# Patient Record
Sex: FEMALE
[De-identification: names, ages, dates, MRNs, and addresses within clinical notes are randomized per-mention and may not be internally consistent; named-entity substitution may affect disease eponyms.]

---

## 2022-07-27 PROBLEM — Z00.00 ENCOUNTER FOR PREVENTIVE HEALTH EXAMINATION: Status: ACTIVE | Noted: 2022-07-27

## 2022-08-24 ENCOUNTER — APPOINTMENT (OUTPATIENT)
Dept: FAMILY MEDICINE | Facility: CLINIC | Age: 27
End: 2022-08-24

## 2022-08-24 VITALS
WEIGHT: 240 LBS | RESPIRATION RATE: 14 BRPM | SYSTOLIC BLOOD PRESSURE: 120 MMHG | DIASTOLIC BLOOD PRESSURE: 82 MMHG | BODY MASS INDEX: 42.52 KG/M2 | HEIGHT: 63 IN | OXYGEN SATURATION: 96 % | HEART RATE: 85 BPM

## 2022-08-24 VITALS — TEMPERATURE: 98 F

## 2022-08-24 DIAGNOSIS — Z13.0 ENCOUNTER FOR SCREENING FOR DISEASES OF THE BLOOD AND BLOOD-FORMING ORGANS AND CERTAIN DISORDERS INVOLVING THE IMMUNE MECHANISM: ICD-10-CM

## 2022-08-24 DIAGNOSIS — Z83.438 FAMILY HISTORY OF OTHER DISORDER OF LIPOPROTEIN METABOLISM AND OTHER LIPIDEMIA: ICD-10-CM

## 2022-08-24 DIAGNOSIS — Z80.1 FAMILY HISTORY OF MALIGNANT NEOPLASM OF TRACHEA, BRONCHUS AND LUNG: ICD-10-CM

## 2022-08-24 DIAGNOSIS — Z83.3 FAMILY HISTORY OF DIABETES MELLITUS: ICD-10-CM

## 2022-08-24 DIAGNOSIS — Z82.5 FAMILY HISTORY OF ASTHMA AND OTHER CHRONIC LOWER RESPIRATORY DISEASES: ICD-10-CM

## 2022-08-24 DIAGNOSIS — Z13.220 ENCOUNTER FOR SCREENING FOR LIPOID DISORDERS: ICD-10-CM

## 2022-08-24 DIAGNOSIS — Z78.9 OTHER SPECIFIED HEALTH STATUS: ICD-10-CM

## 2022-08-24 DIAGNOSIS — Z76.89 PERSONS ENCOUNTERING HEALTH SERVICES IN OTHER SPECIFIED CIRCUMSTANCES: ICD-10-CM

## 2022-08-24 DIAGNOSIS — Z82.49 FAMILY HISTORY OF ISCHEMIC HEART DISEASE AND OTHER DISEASES OF THE CIRCULATORY SYSTEM: ICD-10-CM

## 2022-08-24 DIAGNOSIS — Z82.3 FAMILY HISTORY OF STROKE: ICD-10-CM

## 2022-08-24 LAB — CYTOLOGY CVX/VAG DOC THIN PREP: NORMAL

## 2022-08-24 PROCEDURE — 99204 OFFICE O/P NEW MOD 45 MIN: CPT | Mod: 25

## 2022-08-24 PROCEDURE — G0444 DEPRESSION SCREEN ANNUAL: CPT | Mod: 59

## 2022-08-24 NOTE — PLAN
[FreeTextEntry1] : \par est care\par \par \par depression\par Denies suicidal or homicidal ideations\par advised to see a therapist, behavioral consultation-agreed to this\par Declined ssri for now\par phq9= 18\par \par abd bloating/ pelvic discomfort and abd discomfort on palpation\par us abd\par us pelvic/transvaginal \par gi consult\par celiac testing\par food diary advised\par \par tension headaches vs migraines?\par mri brain /mra\par heating pad prn advised do not use when sleeping\par neurologist consult advised \par excedrin prn or tylenol prn \par \par intermittent rash\par allergist consult\par advised to check for soaps, detergents, lotions or foods and drinks\par \par f/u for cpe\par cpe labs to be done at lab\par \par advised if patient doesn’t hear from me 1 week after testing to call office for results , patient agreed w/plan and understood.\par pt agreed w/plan

## 2022-08-24 NOTE — HISTORY OF PRESENT ILLNESS
[FreeTextEntry1] : patient presents to establish care  [de-identified] : 25yo f presents for est care and c/o bloating, depression , headaches , intermittent skin rash \par \par She feels fine if she is not around her family.\par She carries guilt about not being how she feels her family wants her to be. \par Denies suicidal or homicidal ideations\par she feels like a disappointment to her family-sister and grandma\par she just feels like sometimes it would be better if she was not around bc she is a disappointment\par \par last cpe was 2019\par \par c/o migraines \par c/o  squeezing  pain around entire head like  a band or pulsating ache b/l  , 7-8  /10 intermittent  but when it happens it lasts for a few days, +eye pain and clenches jaw and has pain , alleviated by pushing up on neck, sleeping in dark room, slight improvement w/ blue light glasses  , aggravated by   light and sound \par she works w/ computers\par she took excedrin in the past w/out relief\par \par this is not a new headache, been occurring since 2020 , she said it is worse in the winter\par worse with weather changes w/ rain \par \par c/o bloating at times, she has been trying to avoid gluten bc she is not sure if it is causing her issues

## 2022-08-24 NOTE — HEALTH RISK ASSESSMENT
[1] : 1) Little interest or pleasure doing things for several days (1) [2] : 2) Feeling down, depressed, or hopeless for more than half of the days (2) [PHQ-2 Positive] : PHQ-2 Positive [Several Days (1)] : 3.) Trouble falling asleep, or sleeping too much? Several days [Nearly Every Day (3)] : 6.) Feeling bad about yourself, or that you are a failure, or have let yourself or your family down? Nearly every day [1/2 of Days or More (2)] : 8.) Moving or speaking so slowly that other people could have noticed, or the opposite, moving or speaking faster than usual? Half the days or more [Moderately Severe] : severity of depression is moderately severe [Extremely Difficult] : How difficult have these problems made it for you to do your work, take care of things at home, or get along with people? Extremely difficult [PHQ-9 Positive] : PHQ-9 Positive [I have developed a follow-up plan documented below in the note.] : I have developed a follow-up plan documented below in the note. [[id number]] : 3 [[id number]] : 18

## 2022-08-24 NOTE — PHYSICAL EXAM
[No Lymphadenopathy] : no lymphadenopathy [Supple] : supple [No Edema] : there was no peripheral edema [Normal] : soft, non-tender, non-distended, no masses palpated, no HSM and normal bowel sounds [Normal Posterior Cervical Nodes] : no posterior cervical lymphadenopathy [Normal Anterior Cervical Nodes] : no anterior cervical lymphadenopathy [No Rash] : no rash [No Focal Deficits] : no focal deficits [Normal Affect] : the affect was normal [Normal Mood] : the mood was normal [Normal Insight/Judgement] : insight and judgment were intact [de-identified] : no calf tenderness b/l LE [de-identified] : mild discomfort on palpation of mid abdomen and pelvic region ,no guarding or rigidity  [de-identified] : CN 2-12 INTACT, NORMAL STRENGTH UPPER AND LOWER EXT B/L 5/5, NORMAL RAPID ALTERNATING MOVEMENTS AND FINGER TO NOSE

## 2022-08-24 NOTE — REVIEW OF SYSTEMS
[Depression] : depression [FreeTextEntry7] : +abd bloating  [de-identified] : intermittent skin rash

## 2022-09-07 ENCOUNTER — TRANSCRIPTION ENCOUNTER (OUTPATIENT)
Age: 27
End: 2022-09-07

## 2022-09-16 ENCOUNTER — NON-APPOINTMENT (OUTPATIENT)
Age: 27
End: 2022-09-16

## 2022-09-16 ENCOUNTER — APPOINTMENT (OUTPATIENT)
Dept: FAMILY MEDICINE | Facility: CLINIC | Age: 27
End: 2022-09-16

## 2022-09-16 VITALS
TEMPERATURE: 98 F | HEART RATE: 85 BPM | OXYGEN SATURATION: 97 % | DIASTOLIC BLOOD PRESSURE: 78 MMHG | WEIGHT: 240 LBS | HEIGHT: 63 IN | BODY MASS INDEX: 42.52 KG/M2 | RESPIRATION RATE: 15 BRPM | SYSTOLIC BLOOD PRESSURE: 110 MMHG

## 2022-09-16 DIAGNOSIS — R92.2 INCONCLUSIVE MAMMOGRAM: ICD-10-CM

## 2022-09-16 DIAGNOSIS — R58 HEMORRHAGE, NOT ELSEWHERE CLASSIFIED: ICD-10-CM

## 2022-09-16 DIAGNOSIS — H53.9 UNSPECIFIED VISUAL DISTURBANCE: ICD-10-CM

## 2022-09-16 DIAGNOSIS — Z00.00 ENCOUNTER FOR GENERAL ADULT MEDICAL EXAMINATION W/OUT ABNORMAL FINDINGS: ICD-10-CM

## 2022-09-16 DIAGNOSIS — R10.2 PELVIC AND PERINEAL PAIN: ICD-10-CM

## 2022-09-16 LAB — HCG UR QL: NEGATIVE

## 2022-09-16 PROCEDURE — 99395 PREV VISIT EST AGE 18-39: CPT | Mod: 25

## 2022-09-16 PROCEDURE — 93000 ELECTROCARDIOGRAM COMPLETE: CPT

## 2022-09-16 PROCEDURE — 81025 URINE PREGNANCY TEST: CPT

## 2022-09-16 NOTE — HISTORY OF PRESENT ILLNESS
[FreeTextEntry1] : Patient presents for physical exam  [de-identified] : 25yo F presents for cpe\par she is feeling well\par \par she went to ophthalmologist and her eyesight got worse, astigmatism is worse \par she cannot have mri due to hair extensions so she will have ct head instead\par \par chest pain when anxious , she felt anxious today also\par x 1 week\par felt anxious for past 2weeks\par doesn’t want to take an ssri\par she still feels muscle pulling in her chest\par

## 2022-09-16 NOTE — REVIEW OF SYSTEMS
[Headache] : headache [Chest Pain] : chest pain [Abdominal Pain] : abdominal pain [Mole Changes] : mole changes [Dizziness] : dizziness [Anxiety] : anxiety [Depression] : depression [Negative] : Heme/Lymph [Fever] : no fever [Chills] : no chills [Night Sweats] : no night sweats [Recent Change In Weight] : ~T no recent weight change [Palpitations] : no palpitations [Shortness Of Breath] : no shortness of breath [Cough] : no cough [Nausea] : no nausea [Constipation] : no constipation [Diarrhea] : diarrhea [Vomiting] : no vomiting [Melena] : no melena [Dysuria] : no dysuria [Hematuria] : no hematuria [Frequency] : no frequency [Itching] : no itching [Skin Rash] : no skin rash [Fainting] : no fainting [FreeTextEntry5] : chest pain when feeling anxious  [FreeTextEntry7] : LLq abdominal pain at times  [de-identified] : one mole that looks dark which is new  [de-identified] : dizziness with chronic migraines

## 2022-09-16 NOTE — HEALTH RISK ASSESSMENT
[Smoke Detector] : smoke detector [Carbon Monoxide Detector] : carbon monoxide detector [Seat Belt] :  uses seat belt [Sunscreen] : does not use sunscreen [de-identified] : advised spf 30

## 2022-09-16 NOTE — PHYSICAL EXAM
[No Lymphadenopathy] : no lymphadenopathy [Supple] : supple [No Edema] : there was no peripheral edema [Normal] : soft, non-tender, non-distended, no masses palpated, no HSM and normal bowel sounds [Normal Posterior Cervical Nodes] : no posterior cervical lymphadenopathy [Normal Anterior Cervical Nodes] : no anterior cervical lymphadenopathy [No CVA Tenderness] : no CVA  tenderness [No Rash] : no rash [No Focal Deficits] : no focal deficits [Normal Affect] : the affect was normal [Normal Mood] : the mood was normal [Normal Insight/Judgement] : insight and judgment were intact [Normal Appearance] : normal in appearance [No Nipple Discharge] : no nipple discharge [No Axillary Lymphadenopathy] : no axillary lymphadenopathy [de-identified] : no calf tenderness b/l LE [de-identified] : right breast dense at 2 oclock  [de-identified] : CN 2-12 INTACT, NORMAL STRENGTH UPPER AND LOWER EXT B/L 5/5, NORMAL RAPID ALTERNATING MOVEMENTS AND FINGER TO NOSE

## 2022-09-16 NOTE — PLAN
[FreeTextEntry1] : depression/anxiety \par Denies suicidal or homicidal ideations\par advised to see a therapist, behavioral consultation-agreed to this\par start lexapro 5mg po daily x 1 week and then 2 tabs daily \par phq9= 18 last visit \par \par chest pain when anxious , most likely from anxiety \par -EKG- NSR @89  BPM, NORMAL AXIS, NORMAL NC/QT INTERVAL,FLAT T WAVE LEAD III  NOTED\par EKG reviewed\par cardio consult \par \par one mole that looks dark which is new \par derm consult for body check\par \par abd bloating/ pelvic discomfort and abd discomfort on palpation\par us abd +fatty liver advised to see gi \par us pelvic/transvaginal  wnl \par gi consult\par celiac testing\par food diary advised\par \par tension headaches vs migraines?\par mri brain /mra-cannot perform bc she has metal extensions in hair will perform ct head instead at her request \par heating pad prn advised do not use when sleeping\par neurologist consult advised \par excedrin prn or tylenol prn \par \par intermittent rash\par allergist consult\par advised to check for soaps, detergents, lotions or foods and drinks\par \par easy bleeding\par pt/ptt/inr\par \par abnormal periods\par urine hcg NEG \par \par right breast dense at 2 oclock \par us breasts\par \par cpe labs to be done at lab\par \par advised if patient doesn’t hear from me 1 week after testing to call office for results , patient agreed w/plan and understood.\par pt agreed w/plan. \par \par \par

## 2022-09-28 ENCOUNTER — APPOINTMENT (OUTPATIENT)
Dept: FAMILY MEDICINE | Facility: CLINIC | Age: 27
End: 2022-09-28

## 2022-10-06 ENCOUNTER — APPOINTMENT (OUTPATIENT)
Dept: FAMILY MEDICINE | Facility: CLINIC | Age: 27
End: 2022-10-06

## 2023-01-19 ENCOUNTER — APPOINTMENT (OUTPATIENT)
Dept: FAMILY MEDICINE | Facility: CLINIC | Age: 28
End: 2023-01-19
Payer: MEDICAID

## 2023-01-19 VITALS
SYSTOLIC BLOOD PRESSURE: 112 MMHG | HEART RATE: 99 BPM | OXYGEN SATURATION: 98 % | HEIGHT: 63 IN | DIASTOLIC BLOOD PRESSURE: 80 MMHG | BODY MASS INDEX: 42.52 KG/M2 | WEIGHT: 240 LBS | RESPIRATION RATE: 14 BRPM

## 2023-01-19 VITALS — TEMPERATURE: 97 F

## 2023-01-19 DIAGNOSIS — R10.9 UNSPECIFIED ABDOMINAL PAIN: ICD-10-CM

## 2023-01-19 DIAGNOSIS — R14.0 ABDOMINAL DISTENSION (GASEOUS): ICD-10-CM

## 2023-01-19 DIAGNOSIS — F32.A DEPRESSION, UNSPECIFIED: ICD-10-CM

## 2023-01-19 DIAGNOSIS — L81.9 DISORDER OF PIGMENTATION, UNSPECIFIED: ICD-10-CM

## 2023-01-19 DIAGNOSIS — R07.89 OTHER CHEST PAIN: ICD-10-CM

## 2023-01-19 DIAGNOSIS — N92.6 IRREGULAR MENSTRUATION, UNSPECIFIED: ICD-10-CM

## 2023-01-19 DIAGNOSIS — F41.9 ANXIETY DISORDER, UNSPECIFIED: ICD-10-CM

## 2023-01-19 DIAGNOSIS — R51.9 HEADACHE, UNSPECIFIED: ICD-10-CM

## 2023-01-19 DIAGNOSIS — D22.9 MELANOCYTIC NEVI, UNSPECIFIED: ICD-10-CM

## 2023-01-19 DIAGNOSIS — R21 RASH AND OTHER NONSPECIFIC SKIN ERUPTION: ICD-10-CM

## 2023-01-19 PROCEDURE — 99214 OFFICE O/P EST MOD 30 MIN: CPT

## 2023-01-19 RX ORDER — ESCITALOPRAM OXALATE 5 MG/1
5 TABLET ORAL
Qty: 60 | Refills: 1 | Status: ACTIVE | COMMUNITY
Start: 2022-09-16 | End: 1900-01-01

## 2023-01-20 PROBLEM — R14.0 BLOATING: Status: ACTIVE | Noted: 2022-08-24

## 2023-01-20 PROBLEM — N92.6 IRREGULAR PERIODS: Status: ACTIVE | Noted: 2022-09-16

## 2023-01-20 PROBLEM — R10.9 ABDOMINAL PAIN: Status: ACTIVE | Noted: 2022-08-24

## 2023-01-20 PROBLEM — R07.89 ATYPICAL CHEST PAIN: Status: ACTIVE | Noted: 2022-09-16

## 2023-01-20 PROBLEM — F32.A DEPRESSION: Status: ACTIVE | Noted: 2022-08-24

## 2023-01-20 PROBLEM — R21 RASH: Status: ACTIVE | Noted: 2022-08-24

## 2023-01-20 PROBLEM — R51.9 HEADACHE: Status: ACTIVE | Noted: 2022-08-24

## 2023-01-20 PROBLEM — F41.9 ANXIETY: Status: ACTIVE | Noted: 2022-09-16

## 2023-01-20 NOTE — PHYSICAL EXAM
[No Lymphadenopathy] : no lymphadenopathy [Supple] : supple [No Edema] : there was no peripheral edema [Normal] : soft, non-tender, non-distended, no masses palpated, no HSM and normal bowel sounds [No Focal Deficits] : no focal deficits [Normal Affect] : the affect was normal [Normal Mood] : the mood was normal [Normal Insight/Judgement] : insight and judgment were intact [de-identified] : Small [de-identified] : no calf tenderness b/l LE [de-identified] : No guarding or rigidity [de-identified] : Small hypopigmented round area on her right cheek noted

## 2023-01-20 NOTE — HISTORY OF PRESENT ILLNESS
[FreeTextEntry1] : patient presents for blood work  [de-identified] : -year-old female presents for follow-up for anxiety and depression and headaches, chest pain and rash and abdominal pain.\par Denies suicidal or homicidal ideations\par \par she got a new job and she said it is stressful  and she started  school but she is saying it is stressful\par she feels " stressed these days "\par she moved also\par she said chest pain resolved\par she didn’t see a cardiologist\par She said she thinks most of her symptoms were related to anxiety and depression because when she started the Lexapro most of her symptoms resolved and she is feeling very well but she did not follow-up for refill or call the office for refill when she ran out of medication.\par She is still having abdominal pain at times but has not seen the GI as of yet, she said when she called there was a 1 year wait\par She has not seen the neurologist for headaches but they have improved when her anxiety and depression improved.\par The rash has been less due to her anxiety being less and as per patient.  She said she has only had the rash twice since starting her new job.

## 2023-01-20 NOTE — PLAN
[FreeTextEntry1] : \par \par depression/anxiety , she stopped lexapro bc she ran out and didn’t follow up or call for meds \par Denies suicidal or homicidal ideations\par advised to see a therapist, behavioral consultation-she tried a therapist but didn’t like it \par restart lexapro 5mg po daily x 1 week and then 2 tabs daily to make it 10 mg p.o. daily\par phq9= 18 last visit , will recheck once she is back on lexapro for 1 month \par \par chest pain when anxious , most likely from anxiety -resolved \par -EKG- NSR @89 BPM, NORMAL AXIS, NORMAL OR/QT INTERVAL,FLAT T WAVE LEAD III NOTED\par EKG reviewed last visit\par cardio consult she did not go, advised if chest pain reoccurs\par \par one mole that looks dark which is new and now white spot on right cheek\par derm consult for body check advised staff can make her an appointment at the \par \par abd bloating/ pelvic discomfort and abd discomfort on palpation\par us abd +fatty liver advised to see gi \par us pelvic/transvaginal wnl \par gi consult -hasn’t seen advised to do so \par celiac testing\par food diary advised\par \par tension headaches vs migraines? improved since getting new glasses and when she was taking lexapro \par mri brain /mra-cannot perform bc she has metal extensions in hair will perform ct head instead at her request -was not performed\par heating pad prn advised do not use when sleeping\par neurologist consult advised -advised to do so if migraines continue \par excedrin prn or tylenol prn \par \par intermittent rash -improved \par allergist consult or derm consult advised \par advised to check for soaps, detergents, lotions or foods and drinks\par \par \par abnormal periods\par obgyn consult advised \par \par Labs are still not performed, advised to have them done at the lab\par \par f/u 1month or sooner if worsening, pt agreed w/plan

## 2023-01-20 NOTE — REVIEW OF SYSTEMS
[Anxiety] : anxiety [Depression] : depression [Chest Pain] : no chest pain [Palpitations] : no palpitations [Shortness Of Breath] : no shortness of breath [de-identified] : Rash improving [de-identified] : Headaches improving [de-identified] : Anxiety and depression improving

## 2023-02-21 ENCOUNTER — APPOINTMENT (OUTPATIENT)
Dept: FAMILY MEDICINE | Facility: CLINIC | Age: 28
End: 2023-02-21